# Patient Record
Sex: MALE | Race: BLACK OR AFRICAN AMERICAN | Employment: FULL TIME | URBAN - METROPOLITAN AREA
[De-identification: names, ages, dates, MRNs, and addresses within clinical notes are randomized per-mention and may not be internally consistent; named-entity substitution may affect disease eponyms.]

---

## 2018-07-14 ENCOUNTER — HOSPITAL ENCOUNTER (OUTPATIENT)
Age: 38
Discharge: HOME | End: 2018-07-14
Attending: FAMILY MEDICINE
Payer: COMMERCIAL

## 2018-07-14 VITALS
DIASTOLIC BLOOD PRESSURE: 95 MMHG | HEIGHT: 69 IN | TEMPERATURE: 97 F | OXYGEN SATURATION: 96 % | SYSTOLIC BLOOD PRESSURE: 140 MMHG | RESPIRATION RATE: 18 BRPM | BODY MASS INDEX: 34.51 KG/M2 | WEIGHT: 233 LBS | HEART RATE: 95 BPM

## 2018-07-14 DIAGNOSIS — L50.1 IDIOPATHIC URTICARIA: Primary | ICD-10-CM

## 2018-07-14 PROCEDURE — 99203 OFFICE O/P NEW LOW 30 MIN: CPT

## 2018-07-14 PROCEDURE — 99204 OFFICE O/P NEW MOD 45 MIN: CPT

## 2018-07-14 RX ORDER — PREDNISONE 20 MG/1
20 TABLET ORAL DAILY
Qty: 5 TABLET | Refills: 0 | Status: SHIPPED | OUTPATIENT
Start: 2018-07-14 | End: 2018-07-19

## 2018-07-14 RX ORDER — LOSARTAN POTASSIUM 50 MG/1
TABLET ORAL DAILY
COMMUNITY

## 2018-07-14 RX ORDER — CETIRIZINE HYDROCHLORIDE 10 MG/1
10 TABLET ORAL DAILY
Qty: 5 TABLET | Refills: 0 | Status: SHIPPED | OUTPATIENT
Start: 2018-07-14 | End: 2018-07-19

## 2018-07-14 RX ORDER — HYDROCHLOROTHIAZIDE 12.5 MG/1
12.5 TABLET ORAL DAILY
COMMUNITY

## 2018-07-14 RX ORDER — AMLODIPINE BESYLATE 2.5 MG/1
2.5 TABLET ORAL DAILY
COMMUNITY

## 2018-07-14 NOTE — ED PROVIDER NOTES
Patient Seen in: [facility]    History   Patient presents with:  Hives  Swelling    Stated Complaint: HIVES UPPER TRUNK X 6 DAYS     HPI    16-year-old gentleman with history of hypertension presents with 5 days of pruritic NT, ND, no rebound, no guarding. No CVAT to percussion Manfred  Extremeties: No edema. +2 Manfred DP/PT  Neuro: A&O x3. No focal deficits. Skin: Multiple patches of maculopapular/urticarial lesions at bilateral upper extremities.   Large urticarial patch at the an

## 2018-07-14 NOTE — ED INITIAL ASSESSMENT (HPI)
Pt presents today with c/o hives to chest and torso x 5 days. Pt denies using any new products or trying any new foods. Pt denies any recent illness or fevers. Pt states that he also has swelling to bilateral groin x 1 day.  Pt denies any pain to the groin